# Patient Record
Sex: FEMALE | URBAN - METROPOLITAN AREA
[De-identification: names, ages, dates, MRNs, and addresses within clinical notes are randomized per-mention and may not be internally consistent; named-entity substitution may affect disease eponyms.]

---

## 2022-07-20 ENCOUNTER — OFFICE (OUTPATIENT)
Dept: URBAN - METROPOLITAN AREA CLINIC 18 | Facility: CLINIC | Age: 87
End: 2022-07-20
Payer: COMMERCIAL

## 2022-07-20 VITALS — SYSTOLIC BLOOD PRESSURE: 112 MMHG | WEIGHT: 100 LBS | DIASTOLIC BLOOD PRESSURE: 56 MMHG | HEIGHT: 57 IN

## 2022-07-20 DIAGNOSIS — R10.13 EPIGASTRIC PAIN: ICD-10-CM

## 2022-07-20 DIAGNOSIS — R11.2 NAUSEA WITH VOMITING, UNSPECIFIED: ICD-10-CM

## 2022-07-20 PROCEDURE — 99204 OFFICE O/P NEW MOD 45 MIN: CPT | Performed by: INTERNAL MEDICINE

## 2022-07-20 NOTE — SERVICEHPINOTES
Kenisha Amezcua   is a   89   year old   female   patient who is seen   at the request of   Rosi Oliver   for a consultation/initial visit.  Patient comes in for chief complaint of epigastric abdominal pain. Patient states that she has had this pain for several years. Symptoms have been getting worse lately. There is intermittent nausea and vomiting. Certain foods seem to trigger the pain and she tells me that at times this is quite severe. There is occasional heartburn. There is no dysphagia. She reports no significant weight loss. Recent laboratory test showed that she has mild normocytic anemia. LFTs are normal. Ultrasound the abdomen showed cholelithiasis. She also had a CT of the abdomen done in November 2021 that was fairly unremarkable except for cholelithiasis. No prior endoscopy. She has had a colonoscopy many years ago but does not recall the details. She denies any NSAID use. She is a non-smoker and no alcohol use. No prior history of any peptic ulcer disease or liver disease or jaundice. She was prescribed a PPI by her primary physician but she has not been taking it.

## 2022-08-25 ENCOUNTER — AMBULATORY SURGICAL CENTER (OUTPATIENT)
Dept: URBAN - METROPOLITAN AREA SURGERY 7 | Facility: SURGERY | Age: 87
End: 2022-08-25

## 2022-08-25 ENCOUNTER — AMBULATORY SURGICAL CENTER (OUTPATIENT)
Dept: URBAN - METROPOLITAN AREA SURGERY 7 | Facility: SURGERY | Age: 87
End: 2022-08-25
Payer: COMMERCIAL

## 2022-08-25 VITALS
HEART RATE: 78 BPM | HEIGHT: 57 IN | HEART RATE: 74 BPM | HEART RATE: 78 BPM | SYSTOLIC BLOOD PRESSURE: 190 MMHG | OXYGEN SATURATION: 100 % | DIASTOLIC BLOOD PRESSURE: 40 MMHG | SYSTOLIC BLOOD PRESSURE: 190 MMHG | HEART RATE: 64 BPM | HEART RATE: 72 BPM | DIASTOLIC BLOOD PRESSURE: 107 MMHG | TEMPERATURE: 97 F | OXYGEN SATURATION: 99 % | DIASTOLIC BLOOD PRESSURE: 40 MMHG | HEART RATE: 88 BPM | HEIGHT: 57 IN | DIASTOLIC BLOOD PRESSURE: 68 MMHG | RESPIRATION RATE: 16 BRPM | RESPIRATION RATE: 16 BRPM | WEIGHT: 100 LBS | OXYGEN SATURATION: 99 % | DIASTOLIC BLOOD PRESSURE: 58 MMHG | WEIGHT: 100 LBS | SYSTOLIC BLOOD PRESSURE: 181 MMHG | TEMPERATURE: 97 F | HEART RATE: 74 BPM | SYSTOLIC BLOOD PRESSURE: 181 MMHG | HEART RATE: 88 BPM | HEART RATE: 72 BPM | SYSTOLIC BLOOD PRESSURE: 140 MMHG | SYSTOLIC BLOOD PRESSURE: 111 MMHG | OXYGEN SATURATION: 100 % | DIASTOLIC BLOOD PRESSURE: 58 MMHG | HEART RATE: 64 BPM | SYSTOLIC BLOOD PRESSURE: 140 MMHG | DIASTOLIC BLOOD PRESSURE: 68 MMHG | SYSTOLIC BLOOD PRESSURE: 111 MMHG | DIASTOLIC BLOOD PRESSURE: 107 MMHG | SYSTOLIC BLOOD PRESSURE: 173 MMHG | SYSTOLIC BLOOD PRESSURE: 173 MMHG

## 2022-08-25 DIAGNOSIS — K22.2 ESOPHAGEAL OBSTRUCTION: ICD-10-CM

## 2022-08-25 DIAGNOSIS — K44.9 DIAPHRAGMATIC HERNIA WITHOUT OBSTRUCTION OR GANGRENE: ICD-10-CM

## 2022-08-25 DIAGNOSIS — R10.13 EPIGASTRIC PAIN: ICD-10-CM

## 2022-08-25 PROCEDURE — 43235 EGD DIAGNOSTIC BRUSH WASH: CPT | Performed by: INTERNAL MEDICINE

## 2024-08-21 ENCOUNTER — OFFICE (OUTPATIENT)
Dept: URBAN - METROPOLITAN AREA CLINIC 18 | Age: 89
End: 2024-08-21
Payer: MEDICARE

## 2024-08-21 VITALS
SYSTOLIC BLOOD PRESSURE: 138 MMHG | WEIGHT: 101.8 LBS | SYSTOLIC BLOOD PRESSURE: 138 MMHG | HEIGHT: 57 IN | DIASTOLIC BLOOD PRESSURE: 62 MMHG | WEIGHT: 101.8 LBS | DIASTOLIC BLOOD PRESSURE: 62 MMHG | HEART RATE: 90 BPM | HEART RATE: 90 BPM | HEIGHT: 57 IN

## 2024-08-21 PROBLEM — K22.2 ESOPHAGEAL OBSTRUCTION: Status: ACTIVE | Noted: 2022-08-25

## 2024-08-21 PROBLEM — K44.9 DIAPHRAGMATIC HERNIA WITHOUT OBSTRUCTION OR GANGRENE: Status: ACTIVE | Noted: 2022-08-25

## 2024-08-21 PROCEDURE — 99214 OFFICE O/P EST MOD 30 MIN: CPT | Performed by: INTERNAL MEDICINE

## 2024-08-21 PROCEDURE — 99204 OFFICE O/P NEW MOD 45 MIN: CPT | Performed by: INTERNAL MEDICINE
